# Patient Record
Sex: MALE | Race: WHITE | NOT HISPANIC OR LATINO | Employment: OTHER | URBAN - NONMETROPOLITAN AREA
[De-identification: names, ages, dates, MRNs, and addresses within clinical notes are randomized per-mention and may not be internally consistent; named-entity substitution may affect disease eponyms.]

---

## 2024-08-22 ENCOUNTER — TELEPHONE (OUTPATIENT)
Dept: SURGERY | Facility: CLINIC | Age: 64
End: 2024-08-22
Payer: MEDICARE

## 2024-08-22 NOTE — TELEPHONE ENCOUNTER
Spoke with Marvin to update information insurance. Pt on 10 yr Colonoscopy recall. Pt stated that he would not be able to schedule until sometime next month.

## 2024-08-23 NOTE — TELEPHONE ENCOUNTER
Pt would like to be scheduled at [facility] on 10/14 w/ Dr. Garzon- Verified pharmacy/insurance-pt would like to try the SuTab option-pt is aware that not all insurances cover this medication and he will be contacted if his insurance does not to go over the other options. Thank you.

## 2024-08-23 NOTE — TELEPHONE ENCOUNTER
PRESCREENING FOR OPEN ACCESS SCHEDULING    Marvin Oliva, 1960  [id number]    08/23/24    If, the patient answers yes to any of the following questions the provider will be informed prior to scheduling open access for approval and documented in the chart.    [x]  Yes  [] No    1. Have you ever had a colonoscopy in the past?      When:  10 yrs ago       Where:       Polyps or other:     []  Yes  [x] No    2. Family history of colon cancer?      Relation:       Age of onset:       Do you currently have any of the following?    []  Yes  [x] No  Rectal bleeding, if so, how long?     []  Yes  [x] No  Abdominal pain, if so, how long?    []  Yes  [x] No  Constipation, if so, how long?    []  Yes  [x] No  Diarrhea, if so, how long?    []  Yes  [x] No  Weight loss, is so, how much?    [] Yes  [x] No  Small caliber stool, if so, how long?    []Yes  [x] No  Do you have Hemorroids?    []Yes  [x] No  Have you been diagnosed with Anemia?    []Yes  [x] No  Do you have difficulty swallowing?    []Yes  [x] No  Do you have acid reflux?    Have you ever had any of the following conditions?    [] Yes  [x] No  Heart attack?      When?       Last cardiac workup?     Blood thinners? Xarelto due to poor circulation in his legs.     [] Yes  [x] No   Lung problems, asthma or COPD?  [] Yes  [x] No  Oxygen required?       [] Yes  [x] No  Stroke?     [] Yes  [x] No  Have you ever had a reaction to anesthesia?

## 2024-08-23 NOTE — TELEPHONE ENCOUNTER
ANTICOAGULATION THERAPY EDUCATION   PATIENT  INSTRUCTION    Your Guide to Using Warfarin:  Please refer to this handout for questions regarding your medication, Coumadin/Warfarin. This handout includes information on dosing, blood testing, possible side effects of Coumadin/Warfarin, using other medications, dietary guidelines and safety concerns while on anticoagulation therapy. Please contact your primary care physician and/or seek appropriate medical care if you experience:  Â· A serious fall  Â· Increased menstrual bleeding   Â· An injury to your head  Â· Notice a different color urine or stool   Â· Increased bleeding with teeth brushing   Â· If you have any other unusual bruising   Â· Have bleeding from your nose or a cut that doesn't stop bleeding         Call your physician immediately if you notice any signs and symptoms of a blood clot such as:  Â· Sudden weakness in any limb  Â· Dizziness or faintness   Â· Numbness or tingling anywhere  Â· New shortness of breath or chest pain   Â· Sudden onset of slurred speech or inability to speak  Â· New pain, swelling, redness or heat in any extremity   Â· Visual changes or loss of sight in either eye         Other factors that may affect your anticoagulation therapy include:  Â· Fever  Â· Stress   Â· Diarrhea  Â· Changes in exercise/activity level   Â· Vomiting         While on Anticoagulation therapy avoid:  Â· Pregnancy, using birth control and hormone replacement therapy    Â· Body piercing or tattoos      Please inform family members and other health care providers that you are on anticoagulation therapy. Make sure to carry an up-to-date medication list. You can also wear a medical alert bracelet to identify that you are on anticoagulation therapy. If you are utilizing an injectable anticoagulation medication you should be aware of how and where the medication should be injected and how to appropriately dispose of the injection needles (sharps).     Additional patient PUT IN OR BOOK,INSTRUCTIONS MAILED, CASE REQUEST SENT   education materials provided to you:  Â· Important Facts about your Coumadin (color handout)  Â· Vitamin K Food List  Â· Travel Fitness Tips for Patients on Coumadin  Â· Prevention and Treatment of Nosebleeds  Â· When To Call Your Physician  Â· Potential and Documented Interaction of Herbs with Coumadin/Warfarin  Â· Lab hours for [facility]

## 2024-08-25 ENCOUNTER — PREP FOR SURGERY (OUTPATIENT)
Dept: OTHER | Facility: HOSPITAL | Age: 64
End: 2024-08-25
Payer: MEDICARE

## 2024-08-25 DIAGNOSIS — Z12.11 SCREENING FOR COLON CANCER: Primary | ICD-10-CM

## 2024-09-05 RX ORDER — BISACODYL 5 MG/1
TABLET, DELAYED RELEASE ORAL
Qty: 4 TABLET | Refills: 0 | Status: SHIPPED | OUTPATIENT
Start: 2024-09-05

## 2024-09-05 RX ORDER — POLYETHYLENE GLYCOL 3350 17 G/17G
POWDER, FOR SOLUTION ORAL
Qty: 238 G | Refills: 0 | Status: SHIPPED | OUTPATIENT
Start: 2024-09-05

## 2024-10-14 ENCOUNTER — OUTSIDE FACILITY SERVICE (OUTPATIENT)
Dept: SURGERY | Facility: CLINIC | Age: 64
End: 2024-10-14
Payer: MEDICARE